# Patient Record
(demographics unavailable — no encounter records)

---

## 2024-11-18 NOTE — DISCUSSION/SUMMARY
[FreeTextEntry1] : no smoking, RBAD . Discussed the risks of DVT and blood clots,strokes  good and bad side effects of the pill discussed and instructions on how to take pills and when to use back up. Encouraged exercise , good diet filled with,plant based foods, calcium and vit.D.rtn 12  months. Discussed SBE, Discussed the NIH suggests minimum of 2.5 hours of exercise a week  Answered any questions she may have.

## 2024-11-18 NOTE — HISTORY OF PRESENT ILLNESS
[FreeTextEntry1] : 21 yo has  cramps is on blisovi and is doing well. SHe has minimal cramps ont he pill. [Never active] : never active